# Patient Record
Sex: FEMALE | Race: WHITE | NOT HISPANIC OR LATINO | Employment: FULL TIME | URBAN - METROPOLITAN AREA
[De-identification: names, ages, dates, MRNs, and addresses within clinical notes are randomized per-mention and may not be internally consistent; named-entity substitution may affect disease eponyms.]

---

## 2017-01-20 ENCOUNTER — THERAPY VISIT (OUTPATIENT)
Dept: PHYSICAL THERAPY | Facility: CLINIC | Age: 42
End: 2017-01-20
Payer: COMMERCIAL

## 2017-01-20 DIAGNOSIS — M25.561 KNEE PAIN, RIGHT: Primary | ICD-10-CM

## 2017-01-20 PROCEDURE — 97161 PT EVAL LOW COMPLEX 20 MIN: CPT | Mod: GP | Performed by: PHYSICAL THERAPIST

## 2017-01-20 PROCEDURE — 97110 THERAPEUTIC EXERCISES: CPT | Mod: GP | Performed by: PHYSICAL THERAPIST

## 2017-01-20 ASSESSMENT — ACTIVITIES OF DAILY LIVING (ADL)
KNEEL ON THE FRONT OF YOUR KNEE: ACTIVITY IS NOT DIFFICULT
KNEE_ACTIVITY_OF_DAILY_LIVING_SUM: 69
GIVING WAY, BUCKLING OR SHIFTING OF KNEE: I DO NOT HAVE THE SYMPTOM
GO DOWN STAIRS: ACTIVITY IS NOT DIFFICULT
RAW_SCORE: 69
STAND: ACTIVITY IS NOT DIFFICULT
AS_A_RESULT_OF_YOUR_KNEE_INJURY,_HOW_WOULD_YOU_RATE_YOUR_CURRENT_LEVEL_OF_DAILY_ACTIVITY?: NEARLY NORMAL
SWELLING: I DO NOT HAVE THE SYMPTOM
HOW_WOULD_YOU_RATE_THE_OVERALL_FUNCTION_OF_YOUR_KNEE_DURING_YOUR_USUAL_DAILY_ACTIVITIES?: NORMAL
KNEE_ACTIVITY_OF_DAILY_LIVING_SCORE: 98.57
HOW_WOULD_YOU_RATE_THE_CURRENT_FUNCTION_OF_YOUR_KNEE_DURING_YOUR_USUAL_DAILY_ACTIVITIES_ON_A_SCALE_FROM_0_TO_100_WITH_100_BEING_YOUR_LEVEL_OF_KNEE_FUNCTION_PRIOR_TO_YOUR_INJURY_AND_0_BEING_THE_INABILITY_TO_PERFORM_ANY_OF_YOUR_USUAL_DAILY_ACTIVITIES?: 95
PAIN: I DO NOT HAVE THE SYMPTOM
GO UP STAIRS: ACTIVITY IS NOT DIFFICULT
WEAKNESS: I HAVE THE SYMPTOM BUT IT DOES NOT AFFECT MY ACTIVITY
LIMPING: I DO NOT HAVE THE SYMPTOM
WALK: ACTIVITY IS NOT DIFFICULT
SIT WITH YOUR KNEE BENT: ACTIVITY IS NOT DIFFICULT
SQUAT: ACTIVITY IS NOT DIFFICULT
RISE FROM A CHAIR: ACTIVITY IS NOT DIFFICULT
STIFFNESS: I DO NOT HAVE THE SYMPTOM

## 2017-01-20 NOTE — Clinical Note
[place] [facility]  [address]  [address]  [facility] [address]  [phone]    2017    Re: Radha Alvarado   :   1975  MRN:  [id number]   REFERRING PHYSICIAN:   Chaitanya Horowitz    [place] [facility] [place]    Date of Initial Evaluation:  2017  Visits:  Rxs Used: 1  Reason for Referral:  Knee pain, right    EVALUATION SUMMARY    Subjective:    Radha Alvarado is a 42 year old female with a right knee condition.  Occurance: during a body pump class.  Condition occurred: during recreation/sport.  This is a new condition  DOI 16. While doing a squat felt pian medial and then a pop on the lateral knee. Patient reports pain:  Anterior and posterior.    Pain is described as aching and sharp and is intermittent and reported as 1/10.   Since onset symptoms are rapidly improving.    General health as reported by patient is excellent.  Pertinent medical history includes:  Mental illness.  Medical allergies: no.  Other surgeries include:  None reported.  Current medications:  None as reported by the patient.  Current occupation is .  Patient is working in normal job without restrictions.  Primary job tasks include:  Prolonged sitting.Pertinent medical history includes:  Mental illness.  Medical allergies: no.  Other surgeries include:  Other (brain, C-sections, gallbladder).  Current medications:  Anti-depressants.  Current occupation is .      Knee Activity of Daily Living Score: 98.57.   Barriers include:  None as reported by the patient.  Red flags:  None as reported by the patient.    Objective:    Knee Evaluation:  ROM:  Prom wnl knee: end range extension and flexion pain   Strength wnl knee: knee extension 5/5 ext, flexion.  HIp flexion 5/5, Glute medius 4/5, glute max 4/5.  Fair M/L control with single leg squat.   Ligament Testing:  Normal  Special Tests: Normal  Palpation:  Normal  Edema:   Normal          Assessment/Plan:      Patient is a 42 year old female with right side knee complaints.    Patient has the following significant findings with corresponding treatment plan.                Diagnosis 1:  RIght knee pain  Pain -  hot/cold therapy, self management, education, directional preference exercise and home program  Decreased ROM/flexibility - manual therapy, therapeutic exercise and home program  Decreased strength - therapeutic exercise, therapeutic activities and home program  Decreased function - therapeutic activities and home program    Therapy Evaluation Codes:   1) History comprised of:   Personal factors that impact the plan of care:      None.    Comorbidity factors that impact the plan of care are:      None.     Medications impacting care: None.  2) Examination of Body Systems comprised of:   Body structures and functions that impact the plan of care:      Knee.   Activity limitations that impact the plan of care are:      Stairs.  3) Clinical presentation characteristics are:   Stable/Uncomplicated.  4) Decision-Making    Low complexity using standardized patient assessment instrument and/or measureable assessment of functional outcome.  Cumulative Therapy Evaluation is: Low complexity.    Previous and current functional limitations:  (See Goal Flow Sheet for this information)    Short term and Long term goals: (See Goal Flow Sheet for this information)     Communication ability:  Patient appears to be able to clearly communicate and understand verbal and written communication and follow directions correctly.  Treatment Explanation - The following has been discussed with the patient:   RX ordered/plan of care  Anticipated outcomes  Possible risks and side effects  This patient would benefit from PT intervention to resume normal activities.   Rehab potential is excellent.    Frequency:  1 X week, once daily  Duration:  for 4 weeks if needed.   Discharge Plan:  Achieve all  LTG.  Independent in home treatment program.  Reach maximal therapeutic benefit.    Thank you for your referral.      INQUIRIES  Therapist: Tomer Worley PT  [place] [facility] [place]  [address] [place] [address]  Phone: [phone]  Fax: [phone]

## 2017-01-20 NOTE — PROGRESS NOTES
Subjective:    Radha Alvarado is a 42 year old female with a right knee condition.  Occurance: during a body pump class.  Condition occurred: during recreation/sport.  This is a new condition  DOI 12/21/16.   While doing a squat felt pian medial and then a pop on the lateral knee. .    Patient reports pain:  Anterior and posterior.    Pain is described as aching and sharp and is intermittent and reported as 1/10.        Since onset symptoms are rapidly improving.        General health as reported by patient is excellent.  Pertinent medical history includes:  Mental illness.  Medical allergies: no.  Other surgeries include:  None reported.  Current medications:  None as reported by the patient.  Current occupation is .  Patient is working in normal job without restrictions.  Primary job tasks include:  Prolonged sitting.    Barriers include:  None as reported by the patient.    Red flags:  None as reported by the patient.                      Objective:    System                                                Knee Evaluation:  ROM:  Prom wnl knee: end range extension and flexion pain   Strength wnl knee: knee extension 5/5 ext, flexion.  HIp flexion 5/5, Glute medius 4/5, glute max 4/5.  Fair M/L control with single leg squat.             Ligament Testing:  Normal                Special Tests: Normal      Palpation:  Normal      Edema:  Normal            General     ROS    Assessment/Plan:      Patient is a 42 year old female with right side knee complaints.    Patient has the following significant findings with corresponding treatment plan.                Diagnosis 1:  RIght knee pain  Pain -  hot/cold therapy, self management, education, directional preference exercise and home program  Decreased ROM/flexibility - manual therapy, therapeutic exercise and home program  Decreased strength - therapeutic exercise, therapeutic activities and home program  Decreased function - therapeutic activities  and home program    Therapy Evaluation Codes:   1) History comprised of:   Personal factors that impact the plan of care:      None.    Comorbidity factors that impact the plan of care are:      None.     Medications impacting care: None.  2) Examination of Body Systems comprised of:   Body structures and functions that impact the plan of care:      Knee.   Activity limitations that impact the plan of care are:      Stairs.  3) Clinical presentation characteristics are:   Stable/Uncomplicated.  4) Decision-Making    Low complexity using standardized patient assessment instrument and/or measureable assessment of functional outcome.  Cumulative Therapy Evaluation is: Low complexity.    Previous and current functional limitations:  (See Goal Flow Sheet for this information)    Short term and Long term goals: (See Goal Flow Sheet for this information)     Communication ability:  Patient appears to be able to clearly communicate and understand verbal and written communication and follow directions correctly.  Treatment Explanation - The following has been discussed with the patient:   RX ordered/plan of care  Anticipated outcomes  Possible risks and side effects  This patient would benefit from PT intervention to resume normal activities.   Rehab potential is excellent.    Frequency:  1 X week, once daily  Duration:  for 4 weeks if needed.   Discharge Plan:  Achieve all LTG.  Independent in home treatment program.  Reach maximal therapeutic benefit.    Please refer to the daily flowsheet for treatment today, total treatment time and time spent performing 1:1 timed codes.

## 2017-01-23 NOTE — PROGRESS NOTES
Subjective:                                       Pertinent medical history includes:  Mental illness.  Medical allergies: no.  Other surgeries include:  Other (brain, C-sections, gallbladder).  Current medications:  Anti-depressants.  Current occupation is .                     Knee Activity of Daily Living Score: 98.57            Objective:    System    Physical Exam    General     ROS    Assessment/Plan:

## 2018-01-22 ENCOUNTER — OFFICE VISIT (OUTPATIENT)
Dept: URGENT CARE | Facility: URGENT CARE | Age: 43
End: 2018-01-22
Payer: COMMERCIAL

## 2018-01-22 VITALS
RESPIRATION RATE: 16 BRPM | OXYGEN SATURATION: 97 % | DIASTOLIC BLOOD PRESSURE: 80 MMHG | HEART RATE: 60 BPM | SYSTOLIC BLOOD PRESSURE: 126 MMHG | TEMPERATURE: 98.3 F

## 2018-01-22 DIAGNOSIS — J01.90 ACUTE SINUSITIS WITH SYMPTOMS > 10 DAYS: Primary | ICD-10-CM

## 2018-01-22 PROCEDURE — 99214 OFFICE O/P EST MOD 30 MIN: CPT | Performed by: FAMILY MEDICINE

## 2018-01-22 RX ORDER — AMOXICILLIN 875 MG
875 TABLET ORAL 2 TIMES DAILY
Qty: 20 TABLET | Refills: 0 | Status: SHIPPED | OUTPATIENT
Start: 2018-01-22 | End: 2018-02-01

## 2018-01-22 NOTE — MR AVS SNAPSHOT
After Visit Summary   1/22/2018    Radha Alvarado    MRN: [id number]           Patient Information     Date Of Birth          1975        Visit Information        Provider Department      1/22/2018 6:40 PM Karen Cooper MD [facility]        Today's Diagnoses     Acute sinusitis with symptoms > 10 days    -  1      Care Instructions      Acute Sinusitis    Acute sinusitis is irritation and swelling of the sinuses. It is usually caused by a viral infection after a common cold. Your doctor can help you find relief.  What is acute sinusitis?  Sinuses are air-filled spaces in the skull behind the face. They are kept moist and clean by a lining of mucosa. Things such as pollen, smoke, and chemical fumes can irritate the mucosa. It can then swell up. As a response to irritation, the mucosa makes more mucus and other fluids. Tiny hairlike cilia cover the mucosa. Cilia help carry mucus toward the opening of the sinus. Too much mucus may cause the cilia to stop working. This blocks the sinus opening. A buildup of fluid in the sinuses then causes pain and pressure. It can also encourage bacteria to grow in the sinuses.  Common symptoms of acute sinusitis  You may have:    Facial soreness pain    Headache    Fever    Fluid draining in the back of the throat (postnasal drip)    Congestion    Drainage that is thick and colored, instead of clear    Cough  Diagnosing acute sinusitis  Your doctor will ask about your symptoms and health history. He or she will look at your ear, nose, and throat. You usually won't need to have X-rays taken.    The doctor may take a sample of mucus to check for bacteria. If you have sinusitis that keeps coming back, you may need imaging tests such as X-rays or CAT scans. This will help your doctor check for a structural problem that may be causing the infection.  Treating acute sinusitis  Treatment is aimed at unblocking the sinus opening and helping  "the cilia work again. You may need to take antihistamine and decongestant medicine. These can reduce inflammation and decrease the amount of fluid your sinuses make. If you have a bacterial infection, you will need to take antibiotic medicine for 10 to 14 days. Take this medicine until it is gone, even if you feel better.  Date Last Reviewed: 10/1/2016    2990-8876 [employer]. [address] [place] [address]. All rights reserved. This information is not intended as a substitute for professional medical care. Always follow your healthcare professional's instructions.                Follow-ups after your visit        Follow-up notes from your care team     Return if symptoms worsen or fail to improve.      Who to contact     If you have questions or need follow up information about today's clinic visit or your schedule please contact [facility] directly at [phone].  Normal or non-critical lab and imaging results will be communicated to you by MyChart, letter or phone within 4 business days after the clinic has received the results. If you do not hear from us within 7 days, please contact the clinic through [employer] or phone. If you have a critical or abnormal lab result, we will notify you by phone as soon as possible.  Submit refill requests through [employer] or call your pharmacy and they will forward the refill request to us. Please allow 3 business days for your refill to be completed.          Additional Information About Your Visit        [employer] Information     [employer] lets you send messages to your doctor, view your test results, renew your prescriptions, schedule appointments and more. To sign up, go to [email or web address] . Click on \"Log in\" on the left side of the screen, which will take you to the Welcome page. Then click on \"Sign up Now\" on the right side of the page.     You will be asked to enter the access code listed below, as well as some personal " information. Please follow the directions to create your username and password.     Your access code is: [email or web address]  Expires: 2018  7:04 PM     Your access code will  in 90 days. If you need help or a new code, please call your [place] [facility] clinic or [phone].        Care EveryWhere ID     This is your Care EveryWhere ID. This could be used by other organizations to access your [facility] medical records  [id number]        Your Vitals Were     Pulse Temperature Respirations Pulse Oximetry          60 98.3  F (36.8  C) (Oral) 16 97%         Blood Pressure from Last 3 Encounters:   18 126/80   16 129/74   11/18/15 126/88    Weight from Last 3 Encounters:   16 154 lb (69.9 kg)   11/18/15 147 lb 3.2 oz (66.8 kg)              Today, you had the following     No orders found for display         Today's Medication Changes          These changes are accurate as of: 18  7:05 PM.  If you have any questions, ask your nurse or doctor.               Start taking these medicines.        Dose/Directions    amoxicillin 875 MG tablet   Commonly known as:  AMOXIL   Used for:  Acute sinusitis with symptoms > 10 days   Started by:  Karen Cooper MD        Dose:  875 mg   Take 1 tablet (875 mg) by mouth 2 times daily for 10 days   Quantity:  20 tablet   Refills:  0            Where to get your medicines      These medications were sent to [id number] - [place], [place] - [address]  AT [facility]  [address] [place] [address]     Phone:  [phone]     amoxicillin 875 MG tablet                Primary Care Provider Fax #    Physician No Ref-Primary [phone]       No address on file        Equal Access to Services     JESSICA VAZQUEZ : Bianca Arce, eric ames, qaybta kaalmada michael, nika ruby. So [facility] [phone].    ATENCIÓN: Si habla español, tiene a dahl disposición servicios  chirag de asistencia lingüística. Anayeli nugent [phone].    We comply with applicable federal civil rights laws and [place] laws. We do not discriminate on the basis of race, color, national origin, age, disability, sex, sexual orientation, or gender identity.            Thank you!     Thank you for choosing [facility]  for your care. Our goal is always to provide you with excellent care. Hearing back from our patients is one way we can continue to improve our services. Please take a few minutes to complete the written survey that you may receive in the mail after your visit with us. Thank you!             Your Updated Medication List - Protect others around you: Learn how to safely use, store and throw away your medicines at www.disposemymeds.org.          This list is accurate as of: 1/22/18  7:05 PM.  Always use your most recent med list.                   Brand Name Dispense Instructions for use Diagnosis    amoxicillin 875 MG tablet    AMOXIL    20 tablet    Take 1 tablet (875 mg) by mouth 2 times daily for 10 days    Acute sinusitis with symptoms > 10 days       cholecalciferol 1000 UNIT tablet    vitamin D3     Take 1,000 Units by mouth        citalopram 20 MG tablet    celeXA     Take 20 mg by mouth daily

## 2018-01-23 NOTE — NURSING NOTE
"Chief Complaint   Patient presents with     Urgent Care     Sinus Problem     left side of face has pressure with teeth pain. Started 9 days ago.     Neck Pain      Initial /80 (BP Location: Right arm, Patient Position: Chair, Cuff Size: Adult Regular)  Pulse 60  Temp 98.3  F (36.8  C) (Oral)  Resp 16  SpO2 97% Estimated body mass index is 22.42 kg/(m^2) as calculated from the following:    Height as of 9/27/16: 5' 9.5\" (1.765 m).    Weight as of 9/27/16: 154 lb (69.9 kg)..  BP completed using cuff size: regular  S CHRIST, CMA    "

## 2018-01-23 NOTE — PROGRESS NOTES
SUBJECTIVE: Radha Alvarado is a 43 year old female patient complaining of left facial pressure and pain, nasal congestion, left sided headache for 9 days preceded by a cold/cough.  No fevers.  Does report left upper dental pain. No fevers.  Not smoker.    OBJECTIVE: /80 (BP Location: Right arm, Patient Position: Chair, Cuff Size: Adult Regular)  Pulse 60  Temp 98.3  F (36.8  C) (Oral)  Resp 16  SpO2 97%   The patient appears healthy, alert and no distress.   EARS: Rt TM: bubbles present and grey. Lt TM: bubbles present and hyperemia  NOSE/SINUS: positive findings: mucosa erythematous and swollen, clear rhinorrhea  Sinus palpation: Maxillary sinus nontender to palpation   THROAT: normal   NECK:Neck supple. No adenopathy. Thyroid symmetric, normal size,   CHEST: Clear to auscultation    ASSESSMENT: Acute Sinusitis > 10 days    PLAN: See orders.   In addition, I have suggested that the patient is to rest and drink plenty of fluids.  RTC if not improving as anticipated.   Karen JOINER      Physical Exam      Patient Instructions     Acute Sinusitis    Acute sinusitis is irritation and swelling of the sinuses. It is usually caused by a viral infection after a common cold. Your doctor can help you find relief.  What is acute sinusitis?  Sinuses are air-filled spaces in the skull behind the face. They are kept moist and clean by a lining of mucosa. Things such as pollen, smoke, and chemical fumes can irritate the mucosa. It can then swell up. As a response to irritation, the mucosa makes more mucus and other fluids. Tiny hairlike cilia cover the mucosa. Cilia help carry mucus toward the opening of the sinus. Too much mucus may cause the cilia to stop working. This blocks the sinus opening. A buildup of fluid in the sinuses then causes pain and pressure. It can also encourage bacteria to grow in the sinuses.  Common symptoms of acute sinusitis  You may have:    Facial  soreness pain    Headache    Fever    Fluid draining in the back of the throat (postnasal drip)    Congestion    Drainage that is thick and colored, instead of clear    Cough  Diagnosing acute sinusitis  Your doctor will ask about your symptoms and health history. He or she will look at your ear, nose, and throat. You usually won't need to have X-rays taken.    The doctor may take a sample of mucus to check for bacteria. If you have sinusitis that keeps coming back, you may need imaging tests such as X-rays or CAT scans. This will help your doctor check for a structural problem that may be causing the infection.  Treating acute sinusitis  Treatment is aimed at unblocking the sinus opening and helping the cilia work again. You may need to take antihistamine and decongestant medicine. These can reduce inflammation and decrease the amount of fluid your sinuses make. If you have a bacterial infection, you will need to take antibiotic medicine for 10 to 14 days. Take this medicine until it is gone, even if you feel better.  Date Last Reviewed: 10/1/2016    0497-3940 [employer]. [address] [place] [address], [place] [address]. All rights reserved. This information is not intended as a substitute for professional medical care. Always follow your healthcare professional's instructions.

## 2018-01-23 NOTE — PATIENT INSTRUCTIONS
Acute Sinusitis    Acute sinusitis is irritation and swelling of the sinuses. It is usually caused by a viral infection after a common cold. Your doctor can help you find relief.  What is acute sinusitis?  Sinuses are air-filled spaces in the skull behind the face. They are kept moist and clean by a lining of mucosa. Things such as pollen, smoke, and chemical fumes can irritate the mucosa. It can then swell up. As a response to irritation, the mucosa makes more mucus and other fluids. Tiny hairlike cilia cover the mucosa. Cilia help carry mucus toward the opening of the sinus. Too much mucus may cause the cilia to stop working. This blocks the sinus opening. A buildup of fluid in the sinuses then causes pain and pressure. It can also encourage bacteria to grow in the sinuses.  Common symptoms of acute sinusitis  You may have:    Facial soreness pain    Headache    Fever    Fluid draining in the back of the throat (postnasal drip)    Congestion    Drainage that is thick and colored, instead of clear    Cough  Diagnosing acute sinusitis  Your doctor will ask about your symptoms and health history. He or she will look at your ear, nose, and throat. You usually won't need to have X-rays taken.    The doctor may take a sample of mucus to check for bacteria. If you have sinusitis that keeps coming back, you may need imaging tests such as X-rays or CAT scans. This will help your doctor check for a structural problem that may be causing the infection.  Treating acute sinusitis  Treatment is aimed at unblocking the sinus opening and helping the cilia work again. You may need to take antihistamine and decongestant medicine. These can reduce inflammation and decrease the amount of fluid your sinuses make. If you have a bacterial infection, you will need to take antibiotic medicine for 10 to 14 days. Take this medicine until it is gone, even if you feel better.  Date Last Reviewed: 10/1/2016    9015-4030 [employer],  [facility]. [address] [place] [address]. All rights reserved. This information is not intended as a substitute for professional medical care. Always follow your healthcare professional's instructions.

## 2022-01-04 ENCOUNTER — OFFICE VISIT (OUTPATIENT)
Dept: URGENT CARE | Facility: URGENT CARE | Age: 47
End: 2022-01-04
Payer: COMMERCIAL

## 2022-01-04 ENCOUNTER — NURSE TRIAGE (OUTPATIENT)
Dept: NURSING | Facility: CLINIC | Age: 47
End: 2022-01-04

## 2022-01-04 VITALS
HEART RATE: 67 BPM | OXYGEN SATURATION: 99 % | WEIGHT: 154 LBS | BODY MASS INDEX: 22.42 KG/M2 | RESPIRATION RATE: 16 BRPM | DIASTOLIC BLOOD PRESSURE: 86 MMHG | TEMPERATURE: 98.1 F | SYSTOLIC BLOOD PRESSURE: 148 MMHG

## 2022-01-04 DIAGNOSIS — H73.012 BULLOUS MYRINGITIS OF LEFT EAR: Primary | ICD-10-CM

## 2022-01-04 DIAGNOSIS — H60.502 ACUTE OTITIS EXTERNA OF LEFT EAR, UNSPECIFIED TYPE: ICD-10-CM

## 2022-01-04 PROCEDURE — 99213 OFFICE O/P EST LOW 20 MIN: CPT | Performed by: PHYSICIAN ASSISTANT

## 2022-01-04 RX ORDER — AZITHROMYCIN 250 MG/1
TABLET, FILM COATED ORAL
Qty: 6 TABLET | Refills: 0 | Status: SHIPPED | OUTPATIENT
Start: 2022-01-04

## 2022-01-04 RX ORDER — NEOMYCIN SULFATE, POLYMYXIN B SULFATE, HYDROCORTISONE 3.5; 10000; 1 MG/ML; [USP'U]/ML; MG/ML
3 SOLUTION/ DROPS AURICULAR (OTIC) 4 TIMES DAILY
Qty: 5 ML | Refills: 0 | Status: SHIPPED | OUTPATIENT
Start: 2022-01-04 | End: 2022-01-11

## 2022-01-05 NOTE — TELEPHONE ENCOUNTER
"Pt seen at [facility]. Looking for zpack and ear drop at [facility]. EMR shows these went to [facility]. Offered to send to [facility] or other pharmacy - pt declined. Says she will get tomorrow at [facility].     Reason for Disposition    [1] Prescription prescribed recently is not at pharmacy AND [2] triager has access to patient's EMR AND [3] prescription is recorded in the EMR    Additional Information    Negative: Drug overdose and triager unable to answer question    Negative: Caller requesting information unrelated to medicine    Negative: Caller requesting a prescription for Strep throat and has a positive culture result    Negative: Rash while taking a medication or within 3 days of stopping it    Negative: Immunization reaction suspected    Negative: [1] Asthma and [2] having symptoms of asthma (cough, wheezing, etc.)    Negative: [1] Influenza symptoms AND [2] anti-viral med prescription request, such as Tamiflu    Negative: [1] Symptom of illness (e.g., headache, abdominal pain, earache, vomiting) AND [2] more than mild    Negative: MORE THAN A DOUBLE DOSE of a prescription or over-the-counter (OTC) drug    Negative: [1] DOUBLE DOSE (an extra dose or lesser amount) of over-the-counter (OTC) drug AND [2] any symptoms (e.g., dizziness, nausea, pain, sleepiness)    Negative: [1] DOUBLE DOSE (an extra dose or lesser amount) of prescription drug AND [2] any symptoms (e.g., dizziness, nausea, pain, sleepiness)    Negative: Took another person's prescription drug    Negative: [1] DOUBLE DOSE (an extra dose or lesser amount) of prescription drug AND [2] NO symptoms (Exception: a double dose of antibiotics)    Negative: Diabetes drug error or overdose (e.g., took wrong type of insulin or took extra dose)    Negative: [1] Request for URGENT new prescription or refill of \"essential\" medication (i.e., likelihood of harm to patient if not taken) AND [2] triager unable to fill per unit policy    Negative: [1] " Prescription not at pharmacy AND [2] was prescribed by PCP recently    Negative: [1] Pharmacy calling with prescription questions AND [2] triager unable to answer question    Negative: [1] Caller has URGENT medication question about med that PCP or specialist prescribed AND [2] triager unable to answer question    Negative: [1] Caller has NON-URGENT medication question about med that PCP prescribed AND [2] triager unable to answer question    Negative: [1] Caller requesting a NON-URGENT new prescription or refill AND [2] triager unable to refill per unit policy    Negative: [1] Caller has medication question about med not prescribed by PCP AND [2] triager unable to answer question (e.g., compatibility with other med, storage)    Negative: Caller requesting a CONTROLLED substance prescription refill (e.g., narcotics, ADHD medicines)    Negative: Caller wants to use a complementary or alternative medicine    Protocols used: MEDICATION QUESTION CALL-A-

## 2022-01-05 NOTE — PROGRESS NOTES
SUBJECTIVE:  Radha Alvarado is a 47 year old female who presents with left ear pain for 3 day(s).   Severity: mild   Timing:gradual onset and still present  Additional symptoms include swimming in [place] prior to syumptoms.      History of recurrent otitis: no      Denies any sinus symptoms        No past medical history on file.  Current Outpatient Medications   Medication Sig Dispense Refill     azithromycin (ZITHROMAX) 250 MG tablet Two tablets first day, then one tablet daily for four days. 6 tablet 0     cholecalciferol (VITAMIN D3) 1000 UNIT tablet Take 1,000 Units by mouth       citalopram (CELEXA) 20 MG tablet Take 20 mg by mouth daily       neomycin-polymyxin-hydrocortisone (CORTISPORIN) 3.5-91134-0 otic solution Place 3 drops Into the left ear 4 times daily for 7 days 5 mL 0     Social History     Tobacco Use     Smoking status: Never Smoker     Smokeless tobacco: Never Used   Substance Use Topics     Alcohol use: Yes     Comment: occasional       ROS:   Review of systems negative except as stated above.    OBJECTIVE:  BP (!) 148/86 (BP Location: Right arm, Patient Position: Sitting, Cuff Size: Adult Regular)   Pulse 67   Temp 98.1  F (36.7  C) (Tympanic)   Resp 16   Wt 69.9 kg (154 lb)   SpO2 99%   BMI 22.42 kg/m     EXAM:  The right TM is normal: no effusions, no erythema, and normal landmarks     The right auditory canal is normal and without drainage, edema or erythema  The left TM is bulging and small vesicle at 2 oclock  The left auditory canal is erythematous and tender  Oropharynx exam is normal: no lesions, erythema, adenopathy or exudate.  GENERAL: no acute distress  EYES: EOMI,  PERRL, conjunctiva clear  NECK: supple, non-tender to palpation, no adenopathy noted  RESP: lungs clear to auscultation - no rales, rhonchi or wheezes  CV: regular rates and rhythm, normal S1 S2, no murmur noted  SKIN: no suspicious lesions or rashes     Assessment:  (H73.012) Bullous myringitis of left ear   (primary encounter diagnosis)  Plan: azithromycin (ZITHROMAX) 250 MG tablet      (H60.502) Acute otitis externa of left ear, unspecified type  Plan: neomycin-polymyxin-hydrocortisone (CORTISPORIN)        3.5-61443-7 otic solution        Red flags and emergent follow up discussed, and understood by patient  Follow up with PCP if symptoms worsen or fail to improve  In 2 days